# Patient Record
Sex: FEMALE | Race: WHITE | NOT HISPANIC OR LATINO | Employment: FULL TIME | URBAN - METROPOLITAN AREA
[De-identification: names, ages, dates, MRNs, and addresses within clinical notes are randomized per-mention and may not be internally consistent; named-entity substitution may affect disease eponyms.]

---

## 2019-12-04 ENCOUNTER — OFFICE VISIT (OUTPATIENT)
Dept: OBGYN CLINIC | Facility: CLINIC | Age: 59
End: 2019-12-04
Payer: COMMERCIAL

## 2019-12-04 VITALS
DIASTOLIC BLOOD PRESSURE: 60 MMHG | HEIGHT: 65 IN | WEIGHT: 155.38 LBS | SYSTOLIC BLOOD PRESSURE: 102 MMHG | BODY MASS INDEX: 25.89 KG/M2

## 2019-12-04 DIAGNOSIS — Z12.31 ENCOUNTER FOR SCREENING MAMMOGRAM FOR MALIGNANT NEOPLASM OF BREAST: ICD-10-CM

## 2019-12-04 DIAGNOSIS — R92.2 DENSE BREAST: ICD-10-CM

## 2019-12-04 DIAGNOSIS — Z01.419 ENCOUNTER FOR GYNECOLOGICAL EXAMINATION WITHOUT ABNORMAL FINDING: Primary | ICD-10-CM

## 2019-12-04 PROCEDURE — S0610 ANNUAL GYNECOLOGICAL EXAMINA: HCPCS | Performed by: OBSTETRICS & GYNECOLOGY

## 2019-12-04 RX ORDER — NICOTINE POLACRILEX 2 MG
GUM BUCCAL
COMMUNITY
End: 2021-10-06 | Stop reason: HOSPADM

## 2019-12-04 RX ORDER — B-COMPLEX WITH VITAMIN C
TABLET ORAL
COMMUNITY
End: 2021-04-26

## 2019-12-04 RX ORDER — LEVOTHYROXINE SODIUM 0.1 MG/1
TABLET ORAL
COMMUNITY
Start: 2018-08-25

## 2019-12-04 RX ORDER — LEVOCETIRIZINE DIHYDROCHLORIDE 5 MG/1
2.5 TABLET, FILM COATED ORAL
COMMUNITY

## 2019-12-04 NOTE — PATIENT INSTRUCTIONS

## 2019-12-04 NOTE — PROGRESS NOTES
Assessment/Plan:    Encounter for gynecological examination without abnormal finding  Pap/HPV current  Mammogram ordered  Colonoscopy current    Encourage healthy diet, exercise, Calcium 1200mg per day and at least 800 iu Vitamin D daily  Diagnoses and all orders for this visit:    Encounter for gynecological examination without abnormal finding    Encounter for screening mammogram for malignant neoplasm of breast  -     Mammo screening bilateral w 3d & cad; Future    Dense breast  -     Mammo screening bilateral w 3d & cad; Future    Other orders  -     levothyroxine 100 mcg tablet  -     levocetirizine (XYZAL) 5 MG tablet; Take 2 5 mg by mouth  -     Multiple Vitamin (MULTI-VITAMIN DAILY PO)  -     Cholecalciferol 25 MCG (1000 UT) CHEW; Chew  -     B Complex Vitamins (VITAMIN B COMPLEX PO); Take 1 capsule by mouth  -     B Complex Vitamins (VITAMIN B COMPLEX) TABS; Take by mouth  -     Biotin 1 MG CAPS; Take by mouth          Subjective:      Patient ID: Malena Lazo is a 61 y o  female  Patient here for new patient yearly exam  No current complaints at this time  Age of first period: 15years old    Lmp: pt is   Last pap: 18 neg,hpv- (due )  Last mammo: 19 dense breast BR1- (3D)  Last colonoscopy:  (due )  Patient is not a smoker  Patient is a social drinker  Patient tries to exercise   at local elementary school  No complaints  Gynecologic Exam   The patient's pertinent negatives include no genital itching, genital lesions, genital odor, genital rash, pelvic pain, vaginal bleeding or vaginal discharge  The patient is experiencing no pain  Pertinent negatives include no chills, constipation, diarrhea, fever, frequency, nausea, sore throat, urgency or vomiting  She is not sexually active   She is postmenopausal        The following portions of the patient's history were reviewed and updated as appropriate:   She  has a past medical history of Abnormal Pap smear of cervix, Arthritis, Chlamydia, and Disease of thyroid gland  She   Patient Active Problem List    Diagnosis Date Noted    Encounter for gynecological examination without abnormal finding 12/04/2019    Lumbosacral spondylosis without myelopathy 06/22/2015    Spinal stenosis of lumbar region without neurogenic claudication 06/22/2015     She  has a past surgical history that includes Dilation and curettage of uterus  Her family history is not on file  She  reports that she has never smoked  She has never used smokeless tobacco  She reports that she drinks alcohol  She reports that she does not use drugs  Current Outpatient Medications   Medication Sig Dispense Refill    B Complex Vitamins (VITAMIN B COMPLEX PO) Take 1 capsule by mouth      B Complex Vitamins (VITAMIN B COMPLEX) TABS Take by mouth      Biotin 1 MG CAPS Take by mouth      Cholecalciferol 25 MCG (1000 UT) CHEW Chew      levocetirizine (XYZAL) 5 MG tablet Take 2 5 mg by mouth      levothyroxine 100 mcg tablet       Multiple Vitamin (MULTI-VITAMIN DAILY PO)        No current facility-administered medications for this visit  Current Outpatient Medications on File Prior to Visit   Medication Sig    B Complex Vitamins (VITAMIN B COMPLEX PO) Take 1 capsule by mouth    B Complex Vitamins (VITAMIN B COMPLEX) TABS Take by mouth    Biotin 1 MG CAPS Take by mouth    Cholecalciferol 25 MCG (1000 UT) CHEW Chew    levocetirizine (XYZAL) 5 MG tablet Take 2 5 mg by mouth    levothyroxine 100 mcg tablet     Multiple Vitamin (MULTI-VITAMIN DAILY PO)      No current facility-administered medications on file prior to visit  She has No Known Allergies       Review of Systems   Constitutional: Negative for activity change, appetite change, chills, fatigue and fever  HENT: Negative for rhinorrhea, sneezing and sore throat  Eyes: Negative for visual disturbance     Respiratory: Negative for cough, shortness of breath and wheezing  Cardiovascular: Negative for chest pain, palpitations and leg swelling  Gastrointestinal: Negative for abdominal distention, constipation, diarrhea, nausea and vomiting  Genitourinary: Negative for difficulty urinating, frequency, pelvic pain, urgency and vaginal discharge  Neurological: Negative for syncope and light-headedness  Objective:      /60 (BP Location: Left arm, Patient Position: Sitting, Cuff Size: Standard)   Ht 5' 5" (1 651 m)   Wt 70 5 kg (155 lb 6 oz)   LMP  (LMP Unknown)   Breastfeeding? No   BMI 25 86 kg/m²          Physical Exam   Constitutional: She appears well-developed and well-nourished  No distress  Pulmonary/Chest: Right breast exhibits no inverted nipple, no mass, no nipple discharge, no skin change and no tenderness  Left breast exhibits no inverted nipple, no mass, no nipple discharge, no skin change and no tenderness  No breast tenderness, discharge or bleeding  Breasts are symmetrical    Abdominal: Soft  Bowel sounds are normal  She exhibits no distension and no mass  There is no tenderness  There is no rebound and no guarding  Genitourinary: No breast tenderness, discharge or bleeding  There is no rash, tenderness, lesion or injury on the right labia  There is no rash, tenderness, lesion or injury on the left labia  Uterus is not deviated, not enlarged, not fixed and not tender  Cervix exhibits no motion tenderness, no discharge and no friability  Right adnexum displays no mass, no tenderness and no fullness  Left adnexum displays no mass, no tenderness and no fullness  No bleeding in the vagina  No vaginal discharge found  Genitourinary Comments: Urethral meatus- no lesions, non tender  Urethra non tender  Bladder non tender  Thin, atrophic vaginal mucosa   Skin: Skin is warm and dry  No rash noted  She is not diaphoretic  No erythema  No pallor  Psychiatric: She has a normal mood and affect

## 2019-12-04 NOTE — ASSESSMENT & PLAN NOTE
Pap/HPV current  Mammogram ordered  Colonoscopy current    Encourage healthy diet, exercise, Calcium 1200mg per day and at least 800 iu Vitamin D daily

## 2020-12-09 ENCOUNTER — OFFICE VISIT (OUTPATIENT)
Dept: OBGYN CLINIC | Facility: HOSPITAL | Age: 60
End: 2020-12-09
Payer: COMMERCIAL

## 2020-12-09 VITALS
HEIGHT: 65 IN | HEART RATE: 52 BPM | SYSTOLIC BLOOD PRESSURE: 95 MMHG | DIASTOLIC BLOOD PRESSURE: 64 MMHG | WEIGHT: 144.2 LBS | BODY MASS INDEX: 24.03 KG/M2

## 2020-12-09 DIAGNOSIS — M65.351 TRIGGER LITTLE FINGER OF RIGHT HAND: ICD-10-CM

## 2020-12-09 DIAGNOSIS — M65.341 TRIGGER RING FINGER OF RIGHT HAND: ICD-10-CM

## 2020-12-09 DIAGNOSIS — M65.331 TRIGGER MIDDLE FINGER OF RIGHT HAND: Primary | ICD-10-CM

## 2020-12-09 PROCEDURE — 99203 OFFICE O/P NEW LOW 30 MIN: CPT | Performed by: ORTHOPAEDIC SURGERY

## 2020-12-09 PROCEDURE — 20550 NJX 1 TENDON SHEATH/LIGAMENT: CPT | Performed by: ORTHOPAEDIC SURGERY

## 2020-12-09 RX ORDER — LIDOCAINE HYDROCHLORIDE 10 MG/ML
1 INJECTION, SOLUTION INFILTRATION; PERINEURAL
Status: COMPLETED | OUTPATIENT
Start: 2020-12-09 | End: 2020-12-09

## 2020-12-09 RX ORDER — BETAMETHASONE SODIUM PHOSPHATE AND BETAMETHASONE ACETATE 3; 3 MG/ML; MG/ML
6 INJECTION, SUSPENSION INTRA-ARTICULAR; INTRALESIONAL; INTRAMUSCULAR; SOFT TISSUE
Status: COMPLETED | OUTPATIENT
Start: 2020-12-09 | End: 2020-12-09

## 2020-12-09 RX ADMIN — LIDOCAINE HYDROCHLORIDE 1 ML: 10 INJECTION, SOLUTION INFILTRATION; PERINEURAL at 13:37

## 2020-12-09 RX ADMIN — BETAMETHASONE SODIUM PHOSPHATE AND BETAMETHASONE ACETATE 6 MG: 3; 3 INJECTION, SUSPENSION INTRA-ARTICULAR; INTRALESIONAL; INTRAMUSCULAR; SOFT TISSUE at 13:37

## 2020-12-22 ENCOUNTER — ANNUAL EXAM (OUTPATIENT)
Dept: OBGYN CLINIC | Facility: CLINIC | Age: 60
End: 2020-12-22
Payer: COMMERCIAL

## 2020-12-22 VITALS — WEIGHT: 143 LBS | BODY MASS INDEX: 23.8 KG/M2 | DIASTOLIC BLOOD PRESSURE: 70 MMHG | SYSTOLIC BLOOD PRESSURE: 118 MMHG

## 2020-12-22 DIAGNOSIS — Z12.31 ENCOUNTER FOR SCREENING MAMMOGRAM FOR MALIGNANT NEOPLASM OF BREAST: ICD-10-CM

## 2020-12-22 DIAGNOSIS — Z01.419 ENCOUNTER FOR GYNECOLOGICAL EXAMINATION WITHOUT ABNORMAL FINDING: Primary | ICD-10-CM

## 2020-12-22 PROCEDURE — S0612 ANNUAL GYNECOLOGICAL EXAMINA: HCPCS | Performed by: OBSTETRICS & GYNECOLOGY

## 2021-01-11 DIAGNOSIS — B37.9 YEAST INFECTION: Primary | ICD-10-CM

## 2021-01-11 RX ORDER — FLUCONAZOLE 150 MG/1
TABLET ORAL
Qty: 2 TABLET | Refills: 0 | Status: SHIPPED | OUTPATIENT
Start: 2021-01-11 | End: 2021-01-14

## 2021-01-11 NOTE — TELEPHONE ENCOUNTER
Pt is having vaginal itching , no discharge or odor, is asking for diflucan because she knows that works best for her

## 2021-01-14 ENCOUNTER — TRANSCRIBE ORDERS (OUTPATIENT)
Dept: ADMINISTRATIVE | Facility: HOSPITAL | Age: 61
End: 2021-01-14

## 2021-01-14 ENCOUNTER — LAB (OUTPATIENT)
Dept: LAB | Facility: HOSPITAL | Age: 61
End: 2021-01-14
Payer: COMMERCIAL

## 2021-01-14 DIAGNOSIS — I10 ESSENTIAL HYPERTENSION, BENIGN: ICD-10-CM

## 2021-01-14 DIAGNOSIS — E55.9 VITAMIN D DEFICIENCY: ICD-10-CM

## 2021-01-14 DIAGNOSIS — D50.9 IRON DEFICIENCY ANEMIA, UNSPECIFIED IRON DEFICIENCY ANEMIA TYPE: ICD-10-CM

## 2021-01-14 DIAGNOSIS — E03.9 ADULT HYPOTHYROIDISM: ICD-10-CM

## 2021-01-14 DIAGNOSIS — E78.5 HYPERLIPIDEMIA, UNSPECIFIED HYPERLIPIDEMIA TYPE: ICD-10-CM

## 2021-01-14 DIAGNOSIS — E03.9 ADULT HYPOTHYROIDISM: Primary | ICD-10-CM

## 2021-01-14 LAB
25(OH)D3 SERPL-MCNC: 28.2 NG/ML (ref 30–100)
ALBUMIN SERPL BCP-MCNC: 3.8 G/DL (ref 3.5–5)
ALP SERPL-CCNC: 52 U/L (ref 46–116)
ALT SERPL W P-5'-P-CCNC: 33 U/L (ref 12–78)
ANION GAP SERPL CALCULATED.3IONS-SCNC: 6 MMOL/L (ref 4–13)
AST SERPL W P-5'-P-CCNC: 24 U/L (ref 5–45)
BACTERIA UR QL AUTO: ABNORMAL /HPF
BILIRUB DIRECT SERPL-MCNC: 0.19 MG/DL (ref 0–0.2)
BILIRUB SERPL-MCNC: 1 MG/DL (ref 0.2–1)
BILIRUB UR QL STRIP: NEGATIVE
BUN SERPL-MCNC: 15 MG/DL (ref 5–25)
CALCIUM SERPL-MCNC: 9.2 MG/DL (ref 8.3–10.1)
CHLORIDE SERPL-SCNC: 106 MMOL/L (ref 100–108)
CHOLEST SERPL-MCNC: 200 MG/DL (ref 50–200)
CLARITY UR: CLEAR
CO2 SERPL-SCNC: 30 MMOL/L (ref 21–32)
COLOR UR: YELLOW
CREAT SERPL-MCNC: 1.06 MG/DL (ref 0.6–1.3)
ERYTHROCYTE [DISTWIDTH] IN BLOOD BY AUTOMATED COUNT: 12.6 % (ref 11.6–15.1)
GFR SERPL CREATININE-BSD FRML MDRD: 57 ML/MIN/1.73SQ M
GLUCOSE P FAST SERPL-MCNC: 83 MG/DL (ref 65–99)
GLUCOSE UR STRIP-MCNC: NEGATIVE MG/DL
HCT VFR BLD AUTO: 41.4 % (ref 34.8–46.1)
HDLC SERPL-MCNC: 56 MG/DL
HGB BLD-MCNC: 13.3 G/DL (ref 11.5–15.4)
HGB UR QL STRIP.AUTO: NEGATIVE
IRON SATN MFR SERPL: 18 %
IRON SERPL-MCNC: 68 UG/DL (ref 50–170)
KETONES UR STRIP-MCNC: ABNORMAL MG/DL
LDLC SERPL CALC-MCNC: 127 MG/DL (ref 0–100)
LEUKOCYTE ESTERASE UR QL STRIP: ABNORMAL
MCH RBC QN AUTO: 30.5 PG (ref 26.8–34.3)
MCHC RBC AUTO-ENTMCNC: 32.1 G/DL (ref 31.4–37.4)
MCV RBC AUTO: 95 FL (ref 82–98)
NITRITE UR QL STRIP: NEGATIVE
NON-SQ EPI CELLS URNS QL MICRO: ABNORMAL /HPF
NONHDLC SERPL-MCNC: 144 MG/DL
PH UR STRIP.AUTO: 5.5 [PH]
PLATELET # BLD AUTO: 290 THOUSANDS/UL (ref 149–390)
PMV BLD AUTO: 9.7 FL (ref 8.9–12.7)
POTASSIUM SERPL-SCNC: 3.9 MMOL/L (ref 3.5–5.3)
PROT SERPL-MCNC: 7.4 G/DL (ref 6.4–8.2)
PROT UR STRIP-MCNC: NEGATIVE MG/DL
RBC # BLD AUTO: 4.36 MILLION/UL (ref 3.81–5.12)
RBC #/AREA URNS AUTO: ABNORMAL /HPF
SODIUM SERPL-SCNC: 142 MMOL/L (ref 136–145)
SP GR UR STRIP.AUTO: 1.02 (ref 1–1.03)
T4 FREE SERPL-MCNC: 1.15 NG/DL (ref 0.76–1.46)
TIBC SERPL-MCNC: 380 UG/DL (ref 250–450)
TRIGL SERPL-MCNC: 85 MG/DL
TSH SERPL DL<=0.05 MIU/L-ACNC: 0.41 UIU/ML (ref 0.36–3.74)
UROBILINOGEN UR QL STRIP.AUTO: 0.2 E.U./DL
WBC # BLD AUTO: 5.65 THOUSAND/UL (ref 4.31–10.16)
WBC #/AREA URNS AUTO: ABNORMAL /HPF

## 2021-01-14 PROCEDURE — 80061 LIPID PANEL: CPT

## 2021-01-14 PROCEDURE — 83540 ASSAY OF IRON: CPT

## 2021-01-14 PROCEDURE — 84439 ASSAY OF FREE THYROXINE: CPT

## 2021-01-14 PROCEDURE — 84443 ASSAY THYROID STIM HORMONE: CPT

## 2021-01-14 PROCEDURE — 82306 VITAMIN D 25 HYDROXY: CPT

## 2021-01-14 PROCEDURE — 83550 IRON BINDING TEST: CPT

## 2021-01-14 PROCEDURE — 36415 COLL VENOUS BLD VENIPUNCTURE: CPT

## 2021-01-14 PROCEDURE — 85027 COMPLETE CBC AUTOMATED: CPT

## 2021-01-14 PROCEDURE — 80048 BASIC METABOLIC PNL TOTAL CA: CPT

## 2021-01-14 PROCEDURE — 81001 URINALYSIS AUTO W/SCOPE: CPT | Performed by: INTERNAL MEDICINE

## 2021-01-14 PROCEDURE — 80076 HEPATIC FUNCTION PANEL: CPT

## 2021-04-13 DIAGNOSIS — Z23 ENCOUNTER FOR IMMUNIZATION: ICD-10-CM

## 2021-04-26 ENCOUNTER — OFFICE VISIT (OUTPATIENT)
Dept: DERMATOLOGY | Facility: CLINIC | Age: 61
End: 2021-04-26
Payer: COMMERCIAL

## 2021-04-26 VITALS — TEMPERATURE: 97.6 F

## 2021-04-26 DIAGNOSIS — Z12.83 SCREENING FOR SKIN CANCER: ICD-10-CM

## 2021-04-26 DIAGNOSIS — L57.8 SOLAR ELASTOSIS: ICD-10-CM

## 2021-04-26 DIAGNOSIS — L82.1 SEBORRHEIC KERATOSIS: ICD-10-CM

## 2021-04-26 DIAGNOSIS — L57.0 ACTINIC KERATOSIS: Primary | ICD-10-CM

## 2021-04-26 PROCEDURE — 17000 DESTRUCT PREMALG LESION: CPT | Performed by: DERMATOLOGY

## 2021-04-26 PROCEDURE — 99203 OFFICE O/P NEW LOW 30 MIN: CPT | Performed by: DERMATOLOGY

## 2021-04-26 RX ORDER — CYCLOSPORINE 0.5 MG/ML
EMULSION OPHTHALMIC
COMMUNITY
Start: 2021-04-11

## 2021-04-26 NOTE — PROGRESS NOTES
[address]  [address] [facility] [address]  [phone]  [phone]     MRN: [id number] : 1960  Encounter: [id number]  Patient Information: Paul Young  Chief complaint: yearly check up    History of present illness:  49-year-old female presents for overall skin check patient seen by for several years with history of eczema in the past   [facility] was [facility] had a couple biopsies 1 turned out to be an actinic keratosis on right forearm  No other problems noted  Patient concerned regarding several spots on his skin  Past Medical History:   Diagnosis Date    Abnormal Pap smear of cervix     Arthritis     Chlamydia     Disease of thyroid gland     Lichen planus-like keratosis (LPLK) 2020    Left Anterior Proximal Thigh - [facility]    Lichenoid actinic keratosis 2020    Right Proximal Dorsal Forearm - [facility]     Past Surgical History:   Procedure Laterality Date    DILATION AND CURETTAGE OF UTERUS       Social History   Social History     Substance and Sexual Activity   Alcohol Use Yes    Frequency: Monthly or less    Comment: socially      Social History     Substance and Sexual Activity   Drug Use Never     Social History     Tobacco Use   Smoking Status Never Smoker   Smokeless Tobacco Never Used     Family History   Problem Relation Age of Onset    Breast cancer Neg Hx     Colon cancer Neg Hx     Ovarian cancer Neg Hx      Meds/Allergies   No Known Allergies    Meds:  Prior to Admission medications    Medication Sig Start Date End Date Taking?  Authorizing Provider   B Complex Vitamins (VITAMIN B COMPLEX PO) Take 1 capsule by mouth   Yes Historical Provider, MD   Biotin 1 MG CAPS Take by mouth   Yes Historical Provider, MD   Cholecalciferol 25 MCG (1000 UT) CHEW Chew   Yes Historical Provider, MD   levocetirizine (XYZAL) 5 MG tablet Take 2 5 mg by mouth   Yes Historical Provider, MD   levothyroxine 100 mcg tablet  8/25/18  Yes Historical Provider, MD   Multiple Vitamin (MULTI-VITAMIN DAILY PO)    Yes Historical Provider, MD   Restasis 0 05 % ophthalmic emulsion INSTILL 1 DROP INTO BOTH EYES TWICE DAILY 4/11/21  Yes Historical Provider, MD   B Complex Vitamins (VITAMIN B COMPLEX) TABS Take by mouth  4/26/21  Historical Provider, MD       Subjective:     Review of Systems:    General: negative for - chills, fatigue, fever,  weight gain or weight loss  Psychological: negative for - anxiety, behavioral disorder, concentration difficulties, decreased libido, depression, irritability, memory difficulties, mood swings, sleep disturbances or suicidal ideation  ENT: negative for - hearing difficulties , nasal congestion, nasal discharge, oral lesions, sinus pain, sneezing, sore throat  Allergy and Immunology: negative for - hives, insect bite sensitivity,  Hematological and Lymphatic: negative for - bleeding problems, blood clots,bruising, swollen lymph nodes  Endocrine: negative for - hair pattern changes, hot flashes, malaise/lethargy, mood swings, palpitations, polydipsia/polyuria, skin changes, temperature intolerance or unexpected weight change  Respiratory: negative for - cough, hemoptysis, orthopnea, shortness of breath, or wheezing  Cardiovascular: negative for - chest pain, dyspnea on exertion, edema,  Gastrointestinal: negative for - abdominal pain, nausea/vomiting  Genito-Urinary: negative for - dysuria, incontinence, irregular/heavy menses or urinary frequency/urgency  Musculoskeletal: negative for - gait disturbance, joint pain, joint stiffness, joint swelling, muscle pain, muscular weakness  Dermatological:  As in HPI  Neurological: negative for confusion, dizziness, headaches, impaired coordination/balance, memory loss, numbness/tingling, seizures, speech problems, tremors or weakness       Objective:   Temp 97 6 °F (36 4 °C) (Temporal)   LMP  (LMP Unknown) Physical Exam:    General Appearance:    Alert, cooperative, no distress   Head:    Normocephalic, without obvious abnormality, atraumatic           Skin:   A full skin exam was performed including scalp, head scalp, eyes, ears, nose, lips, neck, chest, axilla, abdomen, back, buttocks, bilateral upper extremities, bilateral lower extremities, hands, feet, fingers, toes, fingernails, and toenails scaling erythematous areas noted below normal keratotic papules greasy stuck on appearance solar elastotic changes noted on the face with hyperpigmentation telangiectasias  Physical Exam  Constitutional:             Cryotherapy Procedure Note    Pre-operative Diagnosis: actinic keratosis    Plan:  1  Instructed to keep the area dry and clean thereafter  Apply petrolatum if area gets crusty  2  Recommended that the patient use acetaminophen  as needed for pain  Locations:  Right clavicle    Indications: Destruction of actinic keratosis x1    Patient informed of risks (permanent scarring, infection, light or dark discoloration, bleeding, infection, weakness, numbness and recurrence of the lesion) and benefits of the procedure and verbal informed consent obtained  The areas are treated with liquid nitrogen therapy, frozen until ice ball extended 2 mm beyond lesion, allowed to thaw, and treated again  The patient tolerated procedure well  The patient was instructed on post-op care, warned that there may be blister formation, redness and pain  Recommend OTC analgesia as needed for pain  Condition:  Stable    Complications:  none  Assessment:     1  Actinic keratosis     2  Solar elastosis     3  Screening for skin cancer     4  Seborrheic keratosis           Plan:   Actinic Keratosis:  Patient advised lesions are precancers  These should resolve with cryosurgery if not to let us know sun block recommended    Solar elastosis will refer to Dr Abelino Bailey for evaluation  Seborrheic Keratosis  Patient reasurred these are normal growths we acquire with age no treatment needed  Screening for Dermatologic Disorders: Nothing else of concern noted on complete exam follow up in 1 year       Isabelle Neumann MD  4/26/2021,9:48 AM    Portions of the record may have been created with voice recognition software   Occasional wrong word or "sound a like" substitutions may have occurred due to the inherent limitations of voice recognition software   Read the chart carefully and recognize, using context, where substitutions have occurred

## 2021-04-26 NOTE — PATIENT INSTRUCTIONS
Actinic Keratosis:  Patient advised lesions are precancers  These should resolve with cryosurgery if not to let us know sun block recommended  Solar elastosis will refer to Dr Dianna Jimenez for evaluation  Seborrheic Keratosis  Patient reasurred these are normal growths we acquire with age no treatment needed    Screening for Dermatologic Disorders: Nothing else of concern noted on complete exam follow up in 1 year

## 2021-05-21 ENCOUNTER — OFFICE VISIT (OUTPATIENT)
Dept: OBGYN CLINIC | Facility: HOSPITAL | Age: 61
End: 2021-05-21
Payer: COMMERCIAL

## 2021-05-21 VITALS
WEIGHT: 144.8 LBS | DIASTOLIC BLOOD PRESSURE: 70 MMHG | HEART RATE: 59 BPM | SYSTOLIC BLOOD PRESSURE: 107 MMHG | HEIGHT: 65 IN | BODY MASS INDEX: 24.12 KG/M2

## 2021-05-21 DIAGNOSIS — M65.351 TRIGGER LITTLE FINGER OF RIGHT HAND: ICD-10-CM

## 2021-05-21 DIAGNOSIS — M65.341 TRIGGER RING FINGER OF RIGHT HAND: ICD-10-CM

## 2021-05-21 DIAGNOSIS — M65.331 TRIGGER MIDDLE FINGER OF RIGHT HAND: Primary | ICD-10-CM

## 2021-05-21 PROCEDURE — 99213 OFFICE O/P EST LOW 20 MIN: CPT | Performed by: ORTHOPAEDIC SURGERY

## 2021-05-21 PROCEDURE — 20550 NJX 1 TENDON SHEATH/LIGAMENT: CPT | Performed by: ORTHOPAEDIC SURGERY

## 2021-05-21 RX ORDER — LIDOCAINE HYDROCHLORIDE 20 MG/ML
1 INJECTION, SOLUTION EPIDURAL; INFILTRATION; INTRACAUDAL; PERINEURAL
Status: COMPLETED | OUTPATIENT
Start: 2021-05-21 | End: 2021-05-21

## 2021-05-21 RX ORDER — BETAMETHASONE SODIUM PHOSPHATE AND BETAMETHASONE ACETATE 3; 3 MG/ML; MG/ML
6 INJECTION, SUSPENSION INTRA-ARTICULAR; INTRALESIONAL; INTRAMUSCULAR; SOFT TISSUE
Status: COMPLETED | OUTPATIENT
Start: 2021-05-21 | End: 2021-05-21

## 2021-05-21 RX ADMIN — BETAMETHASONE SODIUM PHOSPHATE AND BETAMETHASONE ACETATE 6 MG: 3; 3 INJECTION, SUSPENSION INTRA-ARTICULAR; INTRALESIONAL; INTRAMUSCULAR; SOFT TISSUE at 08:56

## 2021-05-21 RX ADMIN — LIDOCAINE HYDROCHLORIDE 1 ML: 20 INJECTION, SOLUTION EPIDURAL; INFILTRATION; INTRACAUDAL; PERINEURAL at 08:56

## 2021-05-21 NOTE — PROGRESS NOTES
ASSESSMENT/PLAN:    Assessment:   Trigger Finger  right  long finger, ring finger, small finger    Plan:   Right long and small injection #2  Right ring injection #1    Follow Up:  6-8 weeks    To Do Next Visit:  Recheck trigger, discuss surgery if needed    General Discussions:     Trigger FInger: The anatomy and physiology of trigger finger was discussed with the patient today in the office  Edema and increased contact pressure within the flexor tendons at the A1 pulley can cause pain, crepitation, and limitation of function  Treatment options include resting MP blocking splints to decrease edema, oral anti-inflammatory medications, home or formal therapy exercises, up to 2 steroid injections within the tendon sheath, or surgical release  While majority of patients do respond to conservative treatment, up to 20% may require surgical release        _____________________________________________________  CHIEF COMPLAINT:  Chief Complaint   Patient presents with    Right Hand - Triggering, Follow-up     Right long, ring and little finger         SUBJECTIVE:  Paul Araujo is a 64 y o  female who presents for follow up regarding Trigger Finger  right  long finger, small finger  Since last visit, Paul Araujo has tried steroid injections with only partial relief  Today there is Catching and Locking to the right long finger, ring finger and small finger      Radiation: None  Associated symptoms: Catching and Locking  Handedness: right  Work status: retired    PAST MEDICAL HISTORY:  Past Medical History:   Diagnosis Date    Abnormal Pap smear of cervix     Arthritis     Chlamydia     Disease of thyroid gland     Lichen planus-like keratosis (LPLK) 06/16/2020    Left Anterior Proximal [facility]    Lichenoid actinic keratosis 12/07/2020    Right Proximal Dorsal Forearm - [facility]       PAST SURGICAL HISTORY:  Past Surgical History:   Procedure Laterality Date    DILATION AND CURETTAGE OF UTERUS         FAMILY HISTORY:  Family History   Problem Relation Age of Onset    Breast cancer Neg Hx     Colon cancer Neg Hx     Ovarian cancer Neg Hx        SOCIAL HISTORY:  Social History     Tobacco Use    Smoking status: Never Smoker    Smokeless tobacco: Never Used   Substance Use Topics    Alcohol use: Yes     Frequency: Monthly or less     Comment: socially     Drug use: Never       MEDICATIONS:    Current Outpatient Medications:     B Complex Vitamins (VITAMIN B COMPLEX PO), Take 1 capsule by mouth, Disp: , Rfl:     Biotin 1 MG CAPS, Take by mouth, Disp: , Rfl:     Cholecalciferol 25 MCG (1000 UT) CHEW, Chew, Disp: , Rfl:     levocetirizine (XYZAL) 5 MG tablet, Take 2 5 mg by mouth, Disp: , Rfl:     levothyroxine 100 mcg tablet, , Disp: , Rfl:     Multiple Vitamin (MULTI-VITAMIN DAILY PO), , Disp: , Rfl:     Restasis 0 05 % ophthalmic emulsion, INSTILL 1 DROP INTO BOTH EYES TWICE DAILY, Disp: , Rfl:     ALLERGIES:  No Known Allergies    REVIEW OF SYSTEMS:  Pertinent items are noted in HPI  A comprehensive review of systems was negative      LABS:  HgA1c: No results found for: HGBA1C  BMP:   Lab Results   Component Value Date    CALCIUM 9 2 01/14/2021    K 3 9 01/14/2021    CO2 30 01/14/2021     01/14/2021    BUN 15 01/14/2021    CREATININE 1 06 01/14/2021           _____________________________________________________  PHYSICAL EXAMINATION:  Vital signs: /70   Pulse 59   Ht 5' 5" (1 651 m)   Wt 65 7 kg (144 lb 12 8 oz)   LMP  (LMP Unknown)   BMI 24 10 kg/m²   General: well developed and well nourished, alert, oriented times 3 and appears comfortable  Psychiatric: Normal  HEENT: Trachea Midline, No torticollis  Cardiovascular: No discernable arrhythmia  Pulmonary: No wheezing or stridor  Abdomen: No rebound or guarding  Extremities: No peripheral edema  Skin: No masses, erythema, lacerations, fluctation, ulcerations  Neurovascular: Sensation Intact to the Median, Ulnar, Radial Nerve, Motor Intact to the Median, Ulnar, Radial Nerve and Pulses Intact    MUSCULOSKELETAL EXAMINATION:  RIGHT SIDE:  Finger:  Triggering  long finger, Crepitation long finger, ring finger, small finger and Nodules  long finger, thumb    _____________________________________________________  STUDIES REVIEWED:  No Studies to review      PROCEDURES PERFORMED:  Hand/upper extremity injection: L long A1  Universal Protocol:  Consent given by: patient  Patient understanding: patient states understanding of the procedure being performed  Site marked: the operative site was marked  Patient identity confirmed: verbally with patient    Supporting Documentation  Indications: pain   Procedure Details  Condition:trigger finger Location: long finger - L long A1   Needle size: 25 G  Ultrasound guidance: no  Approach: volar  Medications administered: 6 mg betamethasone acetate-betamethasone sodium phosphate 6 (3-3) mg/mL; 1 mL lidocaine (PF) 2 %    Patient tolerance: patient tolerated the procedure well with no immediate complications  Dressing:  Sterile dressing applied     Hand/upper extremity injection: R ring A1  Universal Protocol:  Consent given by: patient  Patient understanding: patient states understanding of the procedure being performed  Site marked: the operative site was marked  Patient identity confirmed: verbally with patient    Supporting Documentation  Indications: pain   Procedure Details  Condition:trigger finger Location: ring finger - R ring A1   Needle size: 25 G  Ultrasound guidance: no  Approach: volar  Medications administered: 6 mg betamethasone acetate-betamethasone sodium phosphate 6 (3-3) mg/mL; 1 mL lidocaine (PF) 2 %    Patient tolerance: patient tolerated the procedure well with no immediate complications  Dressing:  Sterile dressing applied     Hand/upper extremity injection: R small A1  Universal Protocol:  Consent given by: patient  Patient understanding: patient states understanding of the procedure being performed  Site marked: the operative site was marked  Patient identity confirmed: verbally with patient    Supporting Documentation  Indications: pain   Procedure Details  Condition:trigger finger Location: small finger - R small A1   Needle size: 25 G  Ultrasound guidance: no  Approach: volar  Medications administered: 6 mg betamethasone acetate-betamethasone sodium phosphate 6 (3-3) mg/mL; 1 mL lidocaine (PF) 2 %    Patient tolerance: patient tolerated the procedure well with no immediate complications  Dressing:  Sterile dressing applied             Scribe Attestation    I,:  Lizzie Álvarez am acting as a scribe while in the presence of the attending physician :       I,:  Charla Sylvester MD personally performed the services described in this documentation    as scribed in my presence :

## 2021-05-21 NOTE — PATIENT INSTRUCTIONS
What is it TRIGGER FINGER? Stenosing tenosynovitis, commonly known as trigger finger or trigger thumb, involves the pulleys and tendons in the hand that bend the fingers  The tendons work like long ropes connecting the muscles of the forearm with the bones of the fingers and thumb  In the finger, the pulleys are a series of rings that form a tunnel through which the tendons must glide, much like the guides on a fishing edward through which the line (or tendon) must pass  These pulleys hold the tendons close against the bone  The tendons and the tunnel have a slick lining that allows easy gliding of the tendon through the pulleys (see Figure 1)  Trigger finger/thumb occurs when the pulley at the base of the finger becomes too thick and constricting around the tendon, making it hard for the tendon to move freely through the pulley  Sometimes the tendon develops a nodule (knot) or swelling of its lining  Because of the increased resistance to the gliding of the tendon through the  pulley, one may feel pain, popping, or a catching feeling in the finger or thumb (see Figure 2)  When the tendon catches, it produces irritation and more swelling  This causes a vicious cycle of triggering, irritation, and swelling  Sometimes the finger becomes stuck or locked, and is hard to straighten or bend  What causes it? Causes for this condition are not always clear  Some trigger fingers are associated with medical conditions such as rheumatoid arthritis, gout, and diabetes  Local trauma to the palm/base of the finger may be a factor on occasion, but in most cases there is not a clear cause  Signs and symptoms   Trigger finger/thumb may start with discomfort felt at the base of the finger or thumb, where they join the palm  This area is often tender to local pressure  A nodule may sometimes be found in this area   When the finger begins to trigger or lock, the patient may think the  problem is at the middle knuckle of the finger or the tip knuckle of the thumb, since the tendon that is sticking is the one that moves these joints  Treatment  The goal of treatment in trigger finger/thumb is to eliminate the catching or locking and allow full movement of the finger or thumb without discomfort  Swelling around the flexor tendon and tendon sheath must be reduced to allow smooth gliding of the tendon  The wearing of a splint or taking an oral anti-inflammatory medication may sometimes  help  Treatment may also include changing activities to reduce swelling  An injection of steroid into the area around the tendon and pulley is often effective in relieving the trigger finger/thumb  If non-surgical forms of treatment do not relieve the symptoms, surgery may be recommended  This surgery is performed as an outpatient, usually with simple local anesthesia  The goal of surgery is to open the pulley at the base of the finger so that the tendon can glide more freely (see Figure 3)  Active motion of the finger generally begins immediately after surgery  Normal use of the hand can usually be resumed once comfort permits  Some patients may feel tenderness, discomfort, and swelling about the area of their surgery longer than others  Occasionally, hand therapy is required after surgery to regain  better use  © 2012 [employer]  www handcare  org

## 2021-07-23 ENCOUNTER — TELEPHONE (OUTPATIENT)
Dept: GASTROENTEROLOGY | Facility: CLINIC | Age: 61
End: 2021-07-23

## 2021-07-26 ENCOUNTER — TELEPHONE (OUTPATIENT)
Dept: GASTROENTEROLOGY | Facility: CLINIC | Age: 61
End: 2021-07-26

## 2021-08-20 ENCOUNTER — HOSPITAL ENCOUNTER (OUTPATIENT)
Dept: RADIOLOGY | Facility: IMAGING CENTER | Age: 61
Discharge: HOME | End: 2021-08-20
Payer: COMMERCIAL

## 2021-08-20 VITALS — BODY MASS INDEX: 22.29 KG/M2 | WEIGHT: 142 LBS | HEIGHT: 67 IN

## 2021-08-20 DIAGNOSIS — Z12.31 ENCOUNTER FOR SCREENING MAMMOGRAM FOR MALIGNANT NEOPLASM OF BREAST: ICD-10-CM

## 2021-08-20 PROCEDURE — 77067 SCR MAMMO BI INCL CAD: CPT

## 2021-08-20 PROCEDURE — 77063 BREAST TOMOSYNTHESIS BI: CPT

## 2021-09-15 ENCOUNTER — TELEPHONE (OUTPATIENT)
Dept: OBGYN CLINIC | Facility: HOSPITAL | Age: 61
End: 2021-09-15

## 2021-09-15 NOTE — TELEPHONE ENCOUNTER
Patient sees Leeann Mcgregor  She is calling in requesting to schedule sx  She has no been seen since 5/27/21  She wants to know if she has to be seen before scheduling sx or she can schedule?        Please adviseAravind#: [phone]

## 2021-10-06 ENCOUNTER — OFFICE VISIT (OUTPATIENT)
Dept: OBGYN CLINIC | Facility: HOSPITAL | Age: 61
End: 2021-10-06
Payer: COMMERCIAL

## 2021-10-06 VITALS
DIASTOLIC BLOOD PRESSURE: 64 MMHG | BODY MASS INDEX: 22.55 KG/M2 | SYSTOLIC BLOOD PRESSURE: 105 MMHG | WEIGHT: 144 LBS | HEART RATE: 43 BPM

## 2021-10-06 DIAGNOSIS — M65.331 TRIGGER MIDDLE FINGER OF RIGHT HAND: Primary | ICD-10-CM

## 2021-10-06 PROCEDURE — 99214 OFFICE O/P EST MOD 30 MIN: CPT | Performed by: ORTHOPAEDIC SURGERY

## 2021-10-06 RX ORDER — MELATONIN
1000 DAILY
COMMUNITY

## 2021-10-06 RX ORDER — LIDOCAINE HYDROCHLORIDE AND EPINEPHRINE 10; 10 MG/ML; UG/ML
20 INJECTION, SOLUTION INFILTRATION; PERINEURAL ONCE
Status: CANCELLED | OUTPATIENT
Start: 2021-10-06 | End: 2021-10-06

## 2021-11-30 ENCOUNTER — OFFICE VISIT (OUTPATIENT)
Dept: DERMATOLOGY | Facility: CLINIC | Age: 61
End: 2021-11-30
Payer: COMMERCIAL

## 2021-11-30 DIAGNOSIS — Z12.83 SCREENING FOR SKIN CANCER: ICD-10-CM

## 2021-11-30 DIAGNOSIS — L82.1 SEBORRHEIC KERATOSIS: ICD-10-CM

## 2021-11-30 DIAGNOSIS — L57.0 ACTINIC KERATOSIS: Primary | ICD-10-CM

## 2021-11-30 PROCEDURE — 17003 DESTRUCT PREMALG LES 2-14: CPT | Performed by: DERMATOLOGY

## 2021-11-30 PROCEDURE — 17000 DESTRUCT PREMALG LESION: CPT | Performed by: DERMATOLOGY

## 2021-11-30 PROCEDURE — 99213 OFFICE O/P EST LOW 20 MIN: CPT | Performed by: DERMATOLOGY

## 2021-12-06 ENCOUNTER — TELEPHONE (OUTPATIENT)
Dept: OBGYN CLINIC | Facility: OTHER | Age: 61
End: 2021-12-06

## 2021-12-07 ENCOUNTER — HOSPITAL ENCOUNTER (OUTPATIENT)
Facility: HOSPITAL | Age: 61
Setting detail: OUTPATIENT SURGERY
Discharge: HOME | End: 2021-12-07
Attending: ORTHOPAEDIC SURGERY | Admitting: ORTHOPAEDIC SURGERY
Payer: COMMERCIAL

## 2021-12-07 VITALS
TEMPERATURE: 96.8 F | HEART RATE: 53 BPM | BODY MASS INDEX: 22.13 KG/M2 | WEIGHT: 141 LBS | RESPIRATION RATE: 20 BRPM | OXYGEN SATURATION: 94 % | DIASTOLIC BLOOD PRESSURE: 58 MMHG | SYSTOLIC BLOOD PRESSURE: 94 MMHG | HEIGHT: 67 IN

## 2021-12-07 DIAGNOSIS — M65.331 TRIGGER MIDDLE FINGER OF RIGHT HAND: Primary | ICD-10-CM

## 2021-12-07 PROCEDURE — NC001 PR NO CHARGE: Performed by: ORTHOPAEDIC SURGERY

## 2021-12-07 PROCEDURE — 26055 INCISE FINGER TENDON SHEATH: CPT | Performed by: ORTHOPAEDIC SURGERY

## 2021-12-07 RX ORDER — LIDOCAINE HYDROCHLORIDE AND EPINEPHRINE 10; 10 MG/ML; UG/ML
20 INJECTION, SOLUTION INFILTRATION; PERINEURAL ONCE
Status: DISCONTINUED | OUTPATIENT
Start: 2021-12-07 | End: 2021-12-07 | Stop reason: HOSPADM

## 2021-12-07 RX ORDER — SENNOSIDES 8.6 MG
650 CAPSULE ORAL EVERY 8 HOURS PRN
Qty: 30 TABLET | Refills: 0 | Status: SHIPPED | OUTPATIENT
Start: 2021-12-07 | End: 2021-12-17 | Stop reason: ALTCHOICE

## 2021-12-07 RX ORDER — NAPROXEN SODIUM 220 MG
220 TABLET ORAL 2 TIMES DAILY WITH MEALS
Qty: 20 TABLET | Refills: 0 | Status: SHIPPED | OUTPATIENT
Start: 2021-12-07 | End: 2021-12-17 | Stop reason: ALTCHOICE

## 2021-12-07 RX ORDER — HYDROCODONE BITARTRATE AND ACETAMINOPHEN 5; 325 MG/1; MG/1
1 TABLET ORAL EVERY 6 HOURS PRN
Qty: 5 TABLET | Refills: 0 | Status: SHIPPED | OUTPATIENT
Start: 2021-12-07 | End: 2021-12-12

## 2021-12-07 RX ORDER — MAGNESIUM HYDROXIDE 1200 MG/15ML
LIQUID ORAL AS NEEDED
Status: DISCONTINUED | OUTPATIENT
Start: 2021-12-07 | End: 2021-12-07 | Stop reason: HOSPADM

## 2021-12-17 ENCOUNTER — OFFICE VISIT (OUTPATIENT)
Dept: OBGYN CLINIC | Facility: HOSPITAL | Age: 61
End: 2021-12-17

## 2021-12-17 VITALS
HEART RATE: 50 BPM | HEIGHT: 67 IN | SYSTOLIC BLOOD PRESSURE: 100 MMHG | BODY MASS INDEX: 22.13 KG/M2 | DIASTOLIC BLOOD PRESSURE: 65 MMHG | WEIGHT: 141 LBS

## 2021-12-17 DIAGNOSIS — M65.331 TRIGGER MIDDLE FINGER OF RIGHT HAND: Primary | ICD-10-CM

## 2021-12-17 PROCEDURE — 99024 POSTOP FOLLOW-UP VISIT: CPT | Performed by: ORTHOPAEDIC SURGERY

## 2021-12-28 ENCOUNTER — ANNUAL EXAM (OUTPATIENT)
Dept: OBGYN CLINIC | Facility: CLINIC | Age: 61
End: 2021-12-28
Payer: COMMERCIAL

## 2021-12-28 VITALS
SYSTOLIC BLOOD PRESSURE: 118 MMHG | HEIGHT: 67 IN | DIASTOLIC BLOOD PRESSURE: 72 MMHG | WEIGHT: 144.6 LBS | BODY MASS INDEX: 22.7 KG/M2

## 2021-12-28 DIAGNOSIS — Z12.11 ENCOUNTER FOR SCREENING COLONOSCOPY: ICD-10-CM

## 2021-12-28 DIAGNOSIS — Z01.419 ENCOUNTER FOR GYNECOLOGICAL EXAMINATION WITHOUT ABNORMAL FINDING: Primary | ICD-10-CM

## 2021-12-28 DIAGNOSIS — Z12.31 ENCOUNTER FOR SCREENING MAMMOGRAM FOR MALIGNANT NEOPLASM OF BREAST: ICD-10-CM

## 2021-12-28 PROCEDURE — S0612 ANNUAL GYNECOLOGICAL EXAMINA: HCPCS | Performed by: OBSTETRICS & GYNECOLOGY

## 2021-12-28 RX ORDER — LANOLIN ALCOHOL/MO/W.PET/CERES
100 CREAM (GRAM) TOPICAL DAILY
COMMUNITY
End: 2022-01-27

## 2022-01-03 DIAGNOSIS — Z47.89 AFTERCARE FOLLOWING SURGERY OF THE MUSCULOSKELETAL SYSTEM: Primary | ICD-10-CM

## 2022-01-04 ENCOUNTER — EVALUATION (OUTPATIENT)
Dept: OCCUPATIONAL THERAPY | Facility: CLINIC | Age: 62
End: 2022-01-04
Payer: COMMERCIAL

## 2022-01-04 DIAGNOSIS — Z47.89 AFTERCARE FOLLOWING SURGERY OF THE MUSCULOSKELETAL SYSTEM: ICD-10-CM

## 2022-01-04 DIAGNOSIS — M65.331 TRIGGER FINGER, RIGHT MIDDLE FINGER: Primary | ICD-10-CM

## 2022-01-04 PROCEDURE — 97165 OT EVAL LOW COMPLEX 30 MIN: CPT

## 2022-01-04 PROCEDURE — 97110 THERAPEUTIC EXERCISES: CPT

## 2022-01-04 NOTE — PROGRESS NOTES
OT Evaluation     Today's date: 2022  Patient name: Michele Caldera  : 1960  MRN: [id number]  Referring provider: Sandoval Shaffer PA-C  Dx:   Encounter Diagnosis     ICD-10-CM    1  Trigger finger, right middle finger  M65 331    2  Aftercare following surgery of the musculoskeletal system  Z47 89 Ambulatory referral to PT/OT hand therapy       Start Time: 1220  Stop Time: 1300  Total time in clinic (min): 40 minutes    Assessment  Assessment details: Marco Christiansen is a 64 y o  RHD female presenting 4 weeks s/p R MF trigger release  She presents with increased pain at rest and after/with activity, increased swelling of the R MF, decreased end range MP joint extension, and decreased end range digital flexion  There is no evidence of triggering with active fisting  Surgical scar site is minimally adherent but is not tender with palpation  Marco Christiansen has difficulty using the R hand for static gripping and to lift heavy objects  She would benefit from continued skilled OT to address these deficits and enable pain free functional use of her dominant R hand  Marco Christiansen was instructed in a HEP addressing TGE, blocking, LLPS for extension, intrinsic stretches, and scar management  Destiny demonstrated good understanding of HEP and is in agreement with POC  Impairments: abnormal or restricted ROM, lacks appropriate home exercise program and pain with function  Functional limitations: difficulty grasping objects without pain  Symptom irritability: lowBarriers to therapy: none  Understanding of Dx/Px/POC: excellent  Goals  STGs/LTGs (to be achieved in 4 visits):  1  Pt will be independent with HEP  2  Increase MF COLE to 265 or WNL  3   Decrease pain by 50% at worst during functional activity    Plan  Patient would benefit from: OT eval and skilled occupational therapy  Planned modality interventions: ultrasound and thermotherapy: hydrocollator packs  Planned therapy interventions: patient education, stretching, strengthening, therapeutic activities, therapeutic exercise and home exercise program  Other planned therapy interventions: scar mobilization, edema management, IASTM  Frequency: 1x week  Duration in visits: 4  Treatment plan discussed with: patient        Subjective Evaluation    History of Present Illness  Date of surgery: 2021  Mechanism of injury: surgery  Mechanism of injury: Pt reports symptoms of progressively worsening triggering of R MF over the past 1 year  Conservative management including 2 steroid injections failed  Pt is now 4 weeks s/p trigger finger release  Not a recurrent problem   Quality of life: excellent    Pain  Current pain ratin  At best pain ratin  At worst pain ratin  Quality: dull ache  Progression: improved    Social Support  Lives with: spouse    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)  Hand dominance: right    Treatments  Previous treatment: injection treatment  Current treatment: occupational therapy  Patient Goals  Patient goals for therapy: decreased pain  Patient goal: normal use without pain        Objective     Observations     Right Wrist/Hand   Positive for edema  Additional Observation Details  Minimally adherent surgical scar site; no tenderness reported with palpation    Palpation     Right   No palpable tenderness to the intrinsics  Neurological Testing     Additional Neurological Details  Pt denies numbness/tingling in the R hand      Active Range of Motion     Right Thumb   Opposition: WNL    Right Digits   Flexion   Middle     MCP: 80    PIP: 98    DIP: 88  Extension   Middle     MCP: -12    Swelling     Left Wrist/Hand   Middle     Proximal: 5 4 cm    Middle: 4 9 cm  Circumference MCP: 19 1 cm    Additional Swelling Details  PIP joint = 5 7 cm    Right Wrist/Hand   Middle     Proximal: 6 2 cm    Middle: 5 1 cm  Circumference MCP: 19 3 cm    Additional Swelling Details  PIP joint = 5 8 cm             Precautions: Universal      Manuals 1/ IASTM/STM             Scar mobs HEP paper tape            MEM HEP                         Neuro Re-Ed                                                                                                        Ther Ex             TGE 10x HEP            Blocking: DIP/PIP 5x HEP            Digital abd/add 5x HEP            LLPS - ext 5x HEP                                                                Ther Activity                                       Gait Training                                       Modalities

## 2022-01-11 ENCOUNTER — APPOINTMENT (OUTPATIENT)
Dept: OCCUPATIONAL THERAPY | Facility: CLINIC | Age: 62
End: 2022-01-11
Payer: COMMERCIAL

## 2022-01-13 ENCOUNTER — OFFICE VISIT (OUTPATIENT)
Dept: OCCUPATIONAL THERAPY | Facility: CLINIC | Age: 62
End: 2022-01-13
Payer: COMMERCIAL

## 2022-01-13 DIAGNOSIS — M65.331 TRIGGER FINGER, RIGHT MIDDLE FINGER: Primary | ICD-10-CM

## 2022-01-13 DIAGNOSIS — Z47.89 AFTERCARE FOLLOWING SURGERY OF THE MUSCULOSKELETAL SYSTEM: ICD-10-CM

## 2022-01-13 PROCEDURE — 97140 MANUAL THERAPY 1/> REGIONS: CPT

## 2022-01-13 PROCEDURE — 97110 THERAPEUTIC EXERCISES: CPT

## 2022-01-13 NOTE — PROGRESS NOTES
Daily Note     Today's date: 2022  Patient name: Paul Young  : 1960  MRN: [id number]  Referring provider: Ross Mantilla PA-C  Dx:   Encounter Diagnosis     ICD-10-CM    1  Trigger finger, right middle finger  M65 331    2  Aftercare following surgery of the musculoskeletal system  Z47 89                   Subjective: "It doesn't hurt as much "      Objective: See treatment diary below      Assessment: Tolerated treatment well  Pt is able to make a full fist without any evidence of triggering in the R MF  There is no crepitus noted during blocking exercise  Pt reports some discomfort but overall pain has improved significantly   Patient exhibited good technique with therapeutic exercises      Plan: Barber Sanders pt progress in 2 weeks; probable discharge next visit     Precautions: Universal      Manuals            IASTM/STM  5'           Scar mobs HEP paper tape 3'           MEM HEP                         Neuro Re-Ed                                                                                                        Ther Ex  15'           TGE 10x HEP 15x           Blocking: DIP/PIP 5x HEP 15x           Digital abd/add 5x HEP 15x           LLPS - ext 5x HEP 15x                                                               Ther Activity  8'           Dexterity/translation  keypegs 5'           [facility]  Y gummy 2x10                                                  Modalities             MHP  7'

## 2022-01-25 ENCOUNTER — APPOINTMENT (OUTPATIENT)
Dept: LAB | Facility: HOSPITAL | Age: 62
End: 2022-01-25
Payer: COMMERCIAL

## 2022-01-25 DIAGNOSIS — I10 HYPERTENSION, UNSPECIFIED TYPE: ICD-10-CM

## 2022-01-25 DIAGNOSIS — D50.9 IRON DEFICIENCY ANEMIA, UNSPECIFIED IRON DEFICIENCY ANEMIA TYPE: ICD-10-CM

## 2022-01-25 DIAGNOSIS — E78.5 HYPERLIPIDEMIA, UNSPECIFIED HYPERLIPIDEMIA TYPE: ICD-10-CM

## 2022-01-25 DIAGNOSIS — I51.9 MYXEDEMA HEART DISEASE: ICD-10-CM

## 2022-01-25 DIAGNOSIS — E03.9 MYXEDEMA HEART DISEASE: ICD-10-CM

## 2022-01-25 DIAGNOSIS — E55.9 VITAMIN D DEFICIENCY, UNSPECIFIED: ICD-10-CM

## 2022-01-25 LAB
25(OH)D3 SERPL-MCNC: 35.9 NG/ML (ref 30–100)
ALBUMIN SERPL BCP-MCNC: 3.8 G/DL (ref 3.5–5)
ALP SERPL-CCNC: 57 U/L (ref 46–116)
ALT SERPL W P-5'-P-CCNC: 34 U/L (ref 12–78)
ANION GAP SERPL CALCULATED.3IONS-SCNC: 7 MMOL/L (ref 4–13)
AST SERPL W P-5'-P-CCNC: 26 U/L (ref 5–45)
BILIRUB DIRECT SERPL-MCNC: 0.24 MG/DL (ref 0–0.2)
BILIRUB SERPL-MCNC: 1.4 MG/DL (ref 0.2–1)
BUN SERPL-MCNC: 9 MG/DL (ref 5–25)
CALCIUM SERPL-MCNC: 9.4 MG/DL (ref 8.3–10.1)
CHLORIDE SERPL-SCNC: 107 MMOL/L (ref 100–108)
CHOLEST SERPL-MCNC: 198 MG/DL
CO2 SERPL-SCNC: 28 MMOL/L (ref 21–32)
CREAT SERPL-MCNC: 0.97 MG/DL (ref 0.6–1.3)
ERYTHROCYTE [DISTWIDTH] IN BLOOD BY AUTOMATED COUNT: 12.6 % (ref 11.6–15.1)
FERRITIN SERPL-MCNC: 55 NG/ML (ref 8–388)
GFR SERPL CREATININE-BSD FRML MDRD: 63 ML/MIN/1.73SQ M
GLUCOSE P FAST SERPL-MCNC: 95 MG/DL (ref 65–99)
HCT VFR BLD AUTO: 40.5 % (ref 34.8–46.1)
HDLC SERPL-MCNC: 53 MG/DL
HGB BLD-MCNC: 13.5 G/DL (ref 11.5–15.4)
IRON SATN MFR SERPL: 19 % (ref 15–50)
IRON SERPL-MCNC: 66 UG/DL (ref 50–170)
LDLC SERPL CALC-MCNC: 136 MG/DL (ref 0–100)
MCH RBC QN AUTO: 30.7 PG (ref 26.8–34.3)
MCHC RBC AUTO-ENTMCNC: 33.3 G/DL (ref 31.4–37.4)
MCV RBC AUTO: 92 FL (ref 82–98)
NONHDLC SERPL-MCNC: 145 MG/DL
PLATELET # BLD AUTO: 267 THOUSANDS/UL (ref 149–390)
PMV BLD AUTO: 9.7 FL (ref 8.9–12.7)
POTASSIUM SERPL-SCNC: 4 MMOL/L (ref 3.5–5.3)
PROT SERPL-MCNC: 7.4 G/DL (ref 6.4–8.2)
RBC # BLD AUTO: 4.4 MILLION/UL (ref 3.81–5.12)
SODIUM SERPL-SCNC: 142 MMOL/L (ref 136–145)
T4 FREE SERPL-MCNC: 1.38 NG/DL (ref 0.76–1.46)
TIBC SERPL-MCNC: 350 UG/DL (ref 250–450)
TRIGL SERPL-MCNC: 46 MG/DL
TSH SERPL DL<=0.05 MIU/L-ACNC: 0.35 UIU/ML (ref 0.36–3.74)
WBC # BLD AUTO: 5.34 THOUSAND/UL (ref 4.31–10.16)

## 2022-01-25 PROCEDURE — 82728 ASSAY OF FERRITIN: CPT

## 2022-01-25 PROCEDURE — 84439 ASSAY OF FREE THYROXINE: CPT

## 2022-01-25 PROCEDURE — 82306 VITAMIN D 25 HYDROXY: CPT

## 2022-01-25 PROCEDURE — 83550 IRON BINDING TEST: CPT

## 2022-01-25 PROCEDURE — 83540 ASSAY OF IRON: CPT

## 2022-01-25 PROCEDURE — 84443 ASSAY THYROID STIM HORMONE: CPT

## 2022-01-25 PROCEDURE — 80076 HEPATIC FUNCTION PANEL: CPT

## 2022-01-25 PROCEDURE — 80048 BASIC METABOLIC PNL TOTAL CA: CPT

## 2022-01-25 PROCEDURE — 36415 COLL VENOUS BLD VENIPUNCTURE: CPT

## 2022-01-25 PROCEDURE — 85027 COMPLETE CBC AUTOMATED: CPT

## 2022-01-25 PROCEDURE — 80061 LIPID PANEL: CPT

## 2022-01-27 ENCOUNTER — OFFICE VISIT (OUTPATIENT)
Dept: OCCUPATIONAL THERAPY | Facility: CLINIC | Age: 62
End: 2022-01-27
Payer: COMMERCIAL

## 2022-01-27 ENCOUNTER — OFFICE VISIT (OUTPATIENT)
Dept: GASTROENTEROLOGY | Facility: CLINIC | Age: 62
End: 2022-01-27
Payer: COMMERCIAL

## 2022-01-27 VITALS
BODY MASS INDEX: 23.04 KG/M2 | SYSTOLIC BLOOD PRESSURE: 98 MMHG | HEART RATE: 76 BPM | WEIGHT: 146.8 LBS | HEIGHT: 67 IN | DIASTOLIC BLOOD PRESSURE: 62 MMHG

## 2022-01-27 DIAGNOSIS — Z12.11 ENCOUNTER FOR SCREENING COLONOSCOPY: ICD-10-CM

## 2022-01-27 DIAGNOSIS — M65.331 TRIGGER FINGER, RIGHT MIDDLE FINGER: Primary | ICD-10-CM

## 2022-01-27 DIAGNOSIS — Z47.89 AFTERCARE FOLLOWING SURGERY OF THE MUSCULOSKELETAL SYSTEM: ICD-10-CM

## 2022-01-27 DIAGNOSIS — K62.5 GASTROINTESTINAL HEMORRHAGE ASSOCIATED WITH ANORECTAL SOURCE: Primary | ICD-10-CM

## 2022-01-27 PROCEDURE — 97110 THERAPEUTIC EXERCISES: CPT

## 2022-01-27 PROCEDURE — 99203 OFFICE O/P NEW LOW 30 MIN: CPT | Performed by: PHYSICIAN ASSISTANT

## 2022-01-27 NOTE — PROGRESS NOTES
[facility] - Outpatient Consultation  Madalyn Marquez 64 y o  female MRN: [id number]  Encounter: [id number]          ASSESSMENT AND PLAN:      1  Gastrointestinal hemorrhage associated with anorectal source  Colonoscopy in 2010 - normal  Colonoscopy in 2017 - Internal hemorrhoids    She noted a small amount of red blood in her stool x 1 episode a few weeks ago  This is likely hemorrhoidal but given it is a new symptom will plan colonoscopy    Advised high fiber diet  64 ounces water per day  ______________________________________________________________________    HPI:  60-year-old female presents for evaluation of rectal bleeding  She reports that this was 1 episode which occurred few weeks ago  She reports that the blood was both bright red and pink in color  It was not a large amount but she did note it was mixed in the stool and in the water  She denies any irregularities with her bowel movements cells  She denies constipation or diarrhea  She has had no abdominal pain  She has never had rectal bleeding in the past   Her last colonoscopy was in 2017 with noted internal hemorrhoids  She had a colonoscopy previous to this in 2010 which was normal   She denies any family history of colorectal cancer  Blood work from this month including a CBC and iron panel were normal       REVIEW OF SYSTEMS:    CONSTITUTIONAL: Denies any fever, chills, rigors, and weight loss  HEENT: No earache or tinnitus  Denies hearing loss or visual disturbances  CARDIOVASCULAR: No chest pain or palpitations  RESPIRATORY: Denies any cough, hemoptysis, shortness of breath or dyspnea on exertion  GASTROINTESTINAL: As noted in the History of Present Illness  GENITOURINARY: No problems with urination  Denies any hematuria or dysuria  NEUROLOGIC: No dizziness or vertigo, denies headaches  MUSCULOSKELETAL: Denies any muscle or joint pain  SKIN: Denies skin rashes or itching     ENDOCRINE: Denies excessive thirst  Denies intolerance to heat or cold  PSYCHOSOCIAL: Denies depression or anxiety  Denies any recent memory loss         Historical Information   Past Medical History:   Diagnosis Date    Abnormal Pap smear of cervix     Arthritis     Chlamydia     Disease of thyroid gland     Lichen planus-like keratosis (LPLK) 06/16/2020    Left Anterior Proximal Thigh [facility]    Lichenoid actinic keratosis 12/07/2020    Right Proximal Dorsal Forearm [facility]    Seasonal allergies      Past Surgical History:   Procedure Laterality Date    DILATION AND CURETTAGE OF UTERUS      [place] INCISE FINGER TENDON SHEATH Right 12/7/2021    Procedure: Right long finger trigger finger release;  Surgeon: Parviz East MD;  Location: [facility];  Service: Orthopedics     Social History   Social History     Substance and Sexual Activity   Alcohol Use Yes    Comment: socially      Social History     Substance and Sexual Activity   Drug Use Never     Social History     Tobacco Use   Smoking Status Never Smoker   Smokeless Tobacco Never Used     Family History   Problem Relation Age of Onset    Throat cancer Father 66    Pancreatic cancer Sister 79    No Known Problems Maternal Grandmother     No Known Problems Maternal Grandfather     No Known Problems Paternal Grandmother     No Known Problems Paternal Grandfather     Ovarian cancer Maternal Aunt     No Known Problems Maternal Aunt     No Known Problems Maternal Aunt     No Known Problems Maternal Aunt     No Known Problems Paternal Aunt     Breast cancer Neg Hx     Colon cancer Neg Hx        Meds/Allergies       Current Outpatient Medications:     B Complex Vitamins (VITAMIN B COMPLEX PO)    cholecalciferol (VITAMIN D3) 1,000 units tablet    levocetirizine (XYZAL) 5 MG tablet    levothyroxine 100 mcg tablet    Multiple Vitamin (MULTI-VITAMIN DAILY PO)    Restasis 0 05 % ophthalmic emulsion    No Known Allergies        Objective     Blood pressure 98/62, pulse 76, height 5' 7" (1 702 m), weight 66 6 kg (146 lb 12 8 oz), not currently breastfeeding  Body mass index is 22 99 kg/m²  PHYSICAL EXAM:      General Appearance:   Alert, cooperative, no distress   HEENT:   Normocephalic, atraumatic, anicteric      Neck:  Supple, symmetrical, trachea midline   Lungs:   Clear to auscultation bilaterally; no rales, rhonchi or wheezing; respirations unlabored    Heart[de-identified]   Regular rate and rhythm; no murmur, rub, or gallop  Abdomen:   Soft, non-tender, non-distended; normal bowel sounds; no masses, no organomegaly    Genitalia:   Deferred    Rectal:   Deferred    Extremities:  No cyanosis, clubbing or edema    Pulses:  2+ and symmetric    Skin:  No jaundice, rashes, or lesions    Lymph nodes:  No palpable cervical lymphadenopathy        Lab Results:   No visits with results within 1 Day(s) from this visit  Latest known visit with results is:   Transcribe Orders on 01/25/2022   Component Date Value    Clarity, UA 01/25/2022 Clear     Color, UA 01/25/2022 Yellow     Specific Gravity, UA 01/25/2022 1 020     pH, UA 01/25/2022 5 5     Glucose, UA 01/25/2022 Negative     Ketones, UA 01/25/2022 Negative     Blood, UA 01/25/2022 Negative     Protein, UA 01/25/2022 Negative     Nitrite, UA 01/25/2022 Negative     Bilirubin, UA 01/25/2022 Negative     Urobilinogen, UA 01/25/2022 0 2     Leukocytes, UA 01/25/2022 Trace*    WBC, UA 01/25/2022 1-2*    RBC, UA 01/25/2022 None Seen     Bacteria, UA 01/25/2022 Occasional     OTHER OBSERVATIONS 01/25/2022 Renal Epithelial Cells Present     Epithelial Cells 01/25/2022 Occasional     MUCUS THREADS 01/25/2022 Occasional*         Radiology Results:   No results found

## 2022-01-27 NOTE — PATIENT INSTRUCTIONS
Scheduled date of colonoscopy (as of today):1/31/22  Physician performing colonoscopy: Elliot  Location of colonoscopy:[facility]  Bowel prep reviewed with patient:miralax/dulcolax  Instructions reviewed with patient by:Rossi HARRINGTON  Clearances: none

## 2022-01-27 NOTE — H&P (VIEW-ONLY)
[facility] - Outpatient Consultation  Kelsey Goldberg 64 y o  female MRN: [id number]  Encounter: [id number]          ASSESSMENT AND PLAN:      1  Gastrointestinal hemorrhage associated with anorectal source  Colonoscopy in 2010 - normal  Colonoscopy in 2017 - Internal hemorrhoids    She noted a small amount of red blood in her stool x 1 episode a few weeks ago  This is likely hemorrhoidal but given it is a new symptom will plan colonoscopy    Advised high fiber diet  64 ounces water per day  ______________________________________________________________________    HPI:  70-year-old female presents for evaluation of rectal bleeding  She reports that this was 1 episode which occurred few weeks ago  She reports that the blood was both bright red and pink in color  It was not a large amount but she did note it was mixed in the stool and in the water  She denies any irregularities with her bowel movements cells  She denies constipation or diarrhea  She has had no abdominal pain  She has never had rectal bleeding in the past   Her last colonoscopy was in 2017 with noted internal hemorrhoids  She had a colonoscopy previous to this in 2010 which was normal   She denies any family history of colorectal cancer  Blood work from this month including a CBC and iron panel were normal       REVIEW OF SYSTEMS:    CONSTITUTIONAL: Denies any fever, chills, rigors, and weight loss  HEENT: No earache or tinnitus  Denies hearing loss or visual disturbances  CARDIOVASCULAR: No chest pain or palpitations  RESPIRATORY: Denies any cough, hemoptysis, shortness of breath or dyspnea on exertion  GASTROINTESTINAL: As noted in the History of Present Illness  GENITOURINARY: No problems with urination  Denies any hematuria or dysuria  NEUROLOGIC: No dizziness or vertigo, denies headaches  MUSCULOSKELETAL: Denies any muscle or joint pain  SKIN: Denies skin rashes or itching     ENDOCRINE: Denies excessive thirst  Denies intolerance to heat or cold  PSYCHOSOCIAL: Denies depression or anxiety  Denies any recent memory loss         Historical Information   Past Medical History:   Diagnosis Date    Abnormal Pap smear of cervix     Arthritis     Chlamydia     Disease of thyroid gland     Lichen planus-like keratosis (LPLK) 06/16/2020    Left Anterior Proximal Thigh [facility]    Lichenoid actinic keratosis 12/07/2020    Right Proximal Dorsal Forearm [facility]    Seasonal allergies      Past Surgical History:   Procedure Laterality Date    DILATION AND CURETTAGE OF UTERUS      [place] INCISE FINGER TENDON SHEATH Right 12/7/2021    Procedure: Right long finger trigger finger release;  Surgeon: Jearldine Goodell, MD;  Location: [facility];  Service: Orthopedics     Social History   Social History     Substance and Sexual Activity   Alcohol Use Yes    Comment: socially      Social History     Substance and Sexual Activity   Drug Use Never     Social History     Tobacco Use   Smoking Status Never Smoker   Smokeless Tobacco Never Used     Family History   Problem Relation Age of Onset    Throat cancer Father 66    Pancreatic cancer Sister 79    No Known Problems Maternal Grandmother     No Known Problems Maternal Grandfather     No Known Problems Paternal Grandmother     No Known Problems Paternal Grandfather     Ovarian cancer Maternal Aunt     No Known Problems Maternal Aunt     No Known Problems Maternal Aunt     No Known Problems Maternal Aunt     No Known Problems Paternal Aunt     Breast cancer Neg Hx     Colon cancer Neg Hx        Meds/Allergies       Current Outpatient Medications:     B Complex Vitamins (VITAMIN B COMPLEX PO)    cholecalciferol (VITAMIN D3) 1,000 units tablet    levocetirizine (XYZAL) 5 MG tablet    levothyroxine 100 mcg tablet    Multiple Vitamin (MULTI-VITAMIN DAILY PO)    Restasis 0 05 % ophthalmic emulsion    No Known Allergies        Objective     Blood pressure 98/62, pulse 76, height 5' 7" (1 702 m), weight 66 6 kg (146 lb 12 8 oz), not currently breastfeeding  Body mass index is 22 99 kg/m²  PHYSICAL EXAM:      General Appearance:   Alert, cooperative, no distress   HEENT:   Normocephalic, atraumatic, anicteric      Neck:  Supple, symmetrical, trachea midline   Lungs:   Clear to auscultation bilaterally; no rales, rhonchi or wheezing; respirations unlabored    Heart[de-identified]   Regular rate and rhythm; no murmur, rub, or gallop  Abdomen:   Soft, non-tender, non-distended; normal bowel sounds; no masses, no organomegaly    Genitalia:   Deferred    Rectal:   Deferred    Extremities:  No cyanosis, clubbing or edema    Pulses:  2+ and symmetric    Skin:  No jaundice, rashes, or lesions    Lymph nodes:  No palpable cervical lymphadenopathy        Lab Results:   No visits with results within 1 Day(s) from this visit  Latest known visit with results is:   Transcribe Orders on 01/25/2022   Component Date Value    Clarity, UA 01/25/2022 Clear     Color, UA 01/25/2022 Yellow     Specific Gravity, UA 01/25/2022 1 020     pH, UA 01/25/2022 5 5     Glucose, UA 01/25/2022 Negative     Ketones, UA 01/25/2022 Negative     Blood, UA 01/25/2022 Negative     Protein, UA 01/25/2022 Negative     Nitrite, UA 01/25/2022 Negative     Bilirubin, UA 01/25/2022 Negative     Urobilinogen, UA 01/25/2022 0 2     Leukocytes, UA 01/25/2022 Trace*    WBC, UA 01/25/2022 1-2*    RBC, UA 01/25/2022 None Seen     Bacteria, UA 01/25/2022 Occasional     OTHER OBSERVATIONS 01/25/2022 Renal Epithelial Cells Present     Epithelial Cells 01/25/2022 Occasional     MUCUS THREADS 01/25/2022 Occasional*         Radiology Results:   No results found

## 2022-01-28 ENCOUNTER — TELEPHONE (OUTPATIENT)
Dept: SURGERY | Facility: HOSPITAL | Age: 62
End: 2022-01-28

## 2022-01-31 ENCOUNTER — HOSPITAL ENCOUNTER (OUTPATIENT)
Dept: GASTROENTEROLOGY | Facility: HOSPITAL | Age: 62
Setting detail: OUTPATIENT SURGERY
Discharge: HOME | End: 2022-01-31
Admitting: PHYSICIAN ASSISTANT
Payer: COMMERCIAL

## 2022-01-31 ENCOUNTER — ANESTHESIA EVENT (OUTPATIENT)
Dept: GASTROENTEROLOGY | Facility: HOSPITAL | Age: 62
End: 2022-01-31

## 2022-01-31 ENCOUNTER — ANESTHESIA (OUTPATIENT)
Dept: GASTROENTEROLOGY | Facility: HOSPITAL | Age: 62
End: 2022-01-31

## 2022-01-31 VITALS
HEART RATE: 61 BPM | DIASTOLIC BLOOD PRESSURE: 52 MMHG | WEIGHT: 141.76 LBS | TEMPERATURE: 97.3 F | SYSTOLIC BLOOD PRESSURE: 86 MMHG | BODY MASS INDEX: 22.25 KG/M2 | HEIGHT: 67 IN | OXYGEN SATURATION: 95 % | RESPIRATION RATE: 16 BRPM

## 2022-01-31 DIAGNOSIS — K62.5 GASTROINTESTINAL HEMORRHAGE ASSOCIATED WITH ANORECTAL SOURCE: ICD-10-CM

## 2022-01-31 PROCEDURE — 45378 DIAGNOSTIC COLONOSCOPY: CPT | Performed by: INTERNAL MEDICINE

## 2022-01-31 RX ORDER — SODIUM CHLORIDE, SODIUM LACTATE, POTASSIUM CHLORIDE, CALCIUM CHLORIDE 600; 310; 30; 20 MG/100ML; MG/100ML; MG/100ML; MG/100ML
INJECTION, SOLUTION INTRAVENOUS CONTINUOUS PRN
Status: DISCONTINUED | OUTPATIENT
Start: 2022-01-31 | End: 2022-01-31

## 2022-01-31 RX ORDER — LIDOCAINE HYDROCHLORIDE 10 MG/ML
INJECTION, SOLUTION EPIDURAL; INFILTRATION; INTRACAUDAL; PERINEURAL AS NEEDED
Status: DISCONTINUED | OUTPATIENT
Start: 2022-01-31 | End: 2022-01-31

## 2022-01-31 RX ORDER — PROPOFOL 10 MG/ML
INJECTION, EMULSION INTRAVENOUS AS NEEDED
Status: DISCONTINUED | OUTPATIENT
Start: 2022-01-31 | End: 2022-01-31

## 2022-01-31 RX ORDER — HYDROCORTISONE ACETATE 25 MG/1
25 SUPPOSITORY RECTAL 2 TIMES DAILY
Qty: 12 SUPPOSITORY | Refills: 0 | Status: SHIPPED | OUTPATIENT
Start: 2022-01-31

## 2022-01-31 RX ADMIN — PROPOFOL 50 MG: 10 INJECTION, EMULSION INTRAVENOUS at 13:44

## 2022-01-31 RX ADMIN — LIDOCAINE HYDROCHLORIDE 50 MG: 10 INJECTION, SOLUTION EPIDURAL; INFILTRATION; INTRACAUDAL; PERINEURAL at 13:40

## 2022-01-31 RX ADMIN — SODIUM CHLORIDE, SODIUM LACTATE, POTASSIUM CHLORIDE, AND CALCIUM CHLORIDE: .6; .31; .03; .02 INJECTION, SOLUTION INTRAVENOUS at 12:38

## 2022-01-31 RX ADMIN — PROPOFOL 30 MG: 10 INJECTION, EMULSION INTRAVENOUS at 13:42

## 2022-01-31 RX ADMIN — PROPOFOL 120 MG: 10 INJECTION, EMULSION INTRAVENOUS at 13:40

## 2022-01-31 RX ADMIN — PROPOFOL 50 MG: 10 INJECTION, EMULSION INTRAVENOUS at 13:46

## 2022-01-31 NOTE — ANESTHESIA PREPROCEDURE EVALUATION
Procedure:  COLONOSCOPY    Relevant Problems   MUSCULOSKELETAL   (+) Lumbosacral spondylosis without myelopathy        Physical Exam    Airway    Mallampati score: II  TM Distance: >3 FB  Neck ROM: full     Dental   No notable dental hx     Cardiovascular  Rhythm: regular, Rate: normal, No murmur, Cardiovascular exam normal    Pulmonary  Pulmonary exam normal Breath sounds clear to auscultation, No wheezes,     Other Findings        Anesthesia Plan  ASA Score- 2     Anesthesia Type- IV sedation with anesthesia with ASA Monitors  Additional Monitors:   Airway Plan:           Plan Factors-Exercise tolerance (METS): >4 METS  Chart reviewed  Existing labs reviewed  Patient is not a current smoker  Patient instructed to abstain from smoking on day of procedure  Patient did not smoke on day of surgery  There is medical exclusion for perioperative obstructive sleep apnea risk education  Induction- intravenous  Postoperative Plan-     Informed Consent- Anesthetic plan and risks discussed with patient  I personally reviewed this patient with the CRNA  Discussed and agreed on the Anesthesia Plan with the CRNA  Mauri Moody

## 2022-01-31 NOTE — INTERVAL H&P NOTE
H&P reviewed  After examining the patient I find no changes in the patients condition since the H&P had been written      Vitals:    01/31/22 1235   BP: 103/70   Pulse: 80   Resp: 20   Temp: 97 6 °F (36 4 °C)   SpO2: 96%

## 2022-07-27 ENCOUNTER — TELEPHONE (OUTPATIENT)
Dept: OBGYN CLINIC | Facility: CLINIC | Age: 62
End: 2022-07-27

## 2022-07-27 DIAGNOSIS — Z12.31 ENCOUNTER FOR SCREENING MAMMOGRAM FOR MALIGNANT NEOPLASM OF BREAST: Primary | ICD-10-CM

## 2022-07-27 NOTE — TELEPHONE ENCOUNTER
----- Message from Campos De Oliveira sent at 7/27/2022 10:40 AM EDT -----  Regarding: Prescription for Breast Imaging  Dr Kia Ospina,    Could you please give me a prescription for my annual mammogram?  We have moved, and I do not yet have a new doctor      Thank you,  Campos De Oliveira  [phone]

## 2023-09-19 NOTE — PROGRESS NOTES
"   OT Re-Evaluation  and OT Discharge     Today's date: 2022  Patient name: Andrade Garza  : 1960  MRN: [id number]  Referring provider: Royal De La Cruz*  Dx:   Encounter Diagnosis     ICD-10-CM    1  Trigger finger, right middle finger  M65 331    2  Aftercare following surgery of the musculoskeletal system  Z47 89                   Assessment  Assessment details: Ruth Perry is a 64 y o  RHD female presenting 4 weeks s/p R MF trigger release  She presents with increased pain at rest and after/with activity, increased swelling of the R MF, decreased end range MP joint extension, and decreased end range digital flexion  There is no evidence of triggering with active fisting  Surgical scar site is minimally adherent but is not tender with palpation  Ruth Perry has difficulty using the R hand for static gripping and to lift heavy objects  She would benefit from continued skilled OT to address these deficits and enable pain free functional use of her dominant R hand  Ruth Perry was instructed in a HEP addressing TGE, blocking, LLPS for extension, intrinsic stretches, and scar management  Destiny demonstrated good understanding of HEP and is in agreement with POC     22 - Destiny is 7 weeks 2 days s/p R MF trigger release  She has been seen for 3 OT visits and has made excellent progress  R hand/MF digital AROM is WNL, pain & swelling have decreased, and pt reports independent functional use of her dominant R hand  All goals have been met and pt is independent with her HEP  Continued skilled OT is not indicated at this time  Barriers to therapy: none    Goals  STGs/LTGs (to be achieved in 4 visits):  1  Pt will be independent with HEP - MET  2  Increase MF COLE to 265 or WNL - MET  3   Decrease pain by 50% at worst during functional activity - MET    Plan  Plan details: Discharge from OT  Treatment plan discussed with: patient        Subjective Evaluation    History of Present Illness  Date of surgery: 2021  Mechanism of Pended patient given information injury: surgery  Mechanism of injury: Pt reports symptoms of progressively worsening triggering of R MF over the past 1 year  Conservative management including 2 steroid injections failed  Pt is now 4 weeks s/p trigger finger release  Not a recurrent problem   Quality of life: excellent    Pain  No pain reported  Progression: improved    Social Support  Lives with: spouse    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)  Hand dominance: right    Treatments  Previous treatment: injection treatment  Current treatment: occupational therapy  Patient Goals  Patient goals for therapy: decreased pain  Patient goal: normal use without pain        Objective     Observations     Right Wrist/Hand   Negative for edema  Additional Observation Details  Scar site is flat with good mobility  Palpation     Right   No palpable tenderness to the intrinsics  Neurological Testing     Additional Neurological Details  Pt denies numbness/tingling in the R hand  Active Range of Motion     Right Thumb   Opposition: WNL    Right Digits   Flexion   Middle     MCP: 92    PIP: 100    DIP: 92  Extension   Middle     MCP: 0    Swelling     Left Wrist/Hand   Middle     Proximal: 5 4 cm    Middle: 4 9 cm  Circumference MCP: 19 1 cm    Additional Swelling Details  PIP joint = 5 7 cm    Right Wrist/Hand   Middle     Proximal: 5 9 cm    Middle: 5 1 cm  Circumference MCP: 19 3 cm    Additional Swelling Details  PIP joint = 5 8 cm             Daily Note     Today's date: 2022  Patient name: Alyx Wells  : 1960  MRN: [id number]  Referring provider: Rolo Olsen*  Dx:   Encounter Diagnosis     ICD-10-CM    1  Trigger finger, right middle finger  M65 331    2  Aftercare following surgery of the musculoskeletal system  Z47 89                   Subjective: "I'm using my hand for everything "      Objective: See RE for details  See treatment diary below      Assessment: See RE for details   R MF AROM is WNL with no evidence of triggering  Pt is independent with HEP and functional use of her R hand  Recommend discharge from OT  All goals have been met        Plan: D/C OT     Precautions: Universal      Manuals 1/4 1/13 1/27          IASTM/STM  5'           Scar mobs HEP paper tape 3' reviewed HEP          MEM HEP            RE   completed          Neuro Re-Ed                                                                                                        Ther Ex  15' 10'          TGE 10x HEP 15x reviewed HEP          Blocking: DIP/PIP 5x HEP 15x reviewed HEP          Digital abd/add 5x HEP 15x reviewed HEP          LLPS - ext 5x HEP 15x reviewed HEP                                                              Ther Activity  8'           Dexterity/translation  vita 5'           [facility]  Y gummy 2x10                                                  Modalities             MHP  7' 7'

## (undated) DEVICE — SUT PROLENE 4-0 PS-2 18 IN 8682G

## (undated) DEVICE — ACE WRAP 4 IN UNSTERILE

## (undated) DEVICE — STERILE BETHLEHEM PLASTIC HAND: Brand: CARDINAL HEALTH

## (undated) DEVICE — NEEDLE 25G X 1 1/2

## (undated) DEVICE — DISPOSABLE EQUIPMENT COVER: Brand: SMALL TOWEL DRAPE

## (undated) DEVICE — GLOVE SRG BIOGEL 7.5

## (undated) DEVICE — OCCLUSIVE GAUZE STRIP,3% BISMUTH TRIBROMOPHENATE IN PETROLATUM BLEND: Brand: XEROFORM

## (undated) DEVICE — GLOVE INDICATOR PI UNDERGLOVE SZ 8 BLUE

## (undated) DEVICE — PADDING CAST 4 IN  COTTON STRL

## (undated) DEVICE — GAUZE SPONGES,16 PLY: Brand: CURITY

## (undated) DEVICE — SPONGE PVP SCRUB WING STERILE